# Patient Record
(demographics unavailable — no encounter records)

---

## 2024-11-09 NOTE — HISTORY OF PRESENT ILLNESS
[Patient reported PAP Smear was normal] : Patient reported PAP Smear was normal [No] : none [Y] : Patient uses contraception [Oral Contraceptive] : uses oral contraception pills [FreeTextEntry1] : GYN Annual [TextBox_4] : 38YO F patient presents for GYN annual exam. Hx of fibroids. Patient states mild pain with relations, denies bleeding. [PapSmeardate] : 10/31/23 [HPVDate] : 10/31/23 [TextBox_102] : irregular when off OCP due to PCOS [LMPDate] : 10/20/24 [de-identified] : On Junel

## 2024-11-09 NOTE — PHYSICAL EXAM
[Chaperone Present] : A chaperone was present in the examining room during all aspects of the physical examination [34445] : A chaperone was present during the pelvic exam. [Appropriately responsive] : appropriately responsive [Alert] : alert [No Acute Distress] : no acute distress [No Lymphadenopathy] : no lymphadenopathy [Oriented x3] : oriented x3 [Examination Of The Breasts] : a normal appearance [No Masses] : no breast masses were palpable [Labia Majora] : normal [Labia Minora] : normal [Normal] : normal [Soft] : soft [Non-tender] : non-tender [Non-distended] : non-distended [Breast Palpation Diffuse Fibrous Tissue Bilateral] : fibrocystic changes [Uterine Adnexae] : non-palpable [FreeTextEntry2] : YOLANDA Alcala [FreeTextEntry6] : No tenderness, No nipple discharge and no adenopathy. [FreeTextEntry8] : limited exam by body habitus

## 2024-11-09 NOTE — PLAN
[FreeTextEntry1] : Follow up STD's , FSH and TFT's states menses are light and regular on OCP  follow up MRI results  f/up in office 8-12 weeks

## 2024-11-09 NOTE — PHYSICAL EXAM
[Chaperone Present] : A chaperone was present in the examining room during all aspects of the physical examination [10709] : A chaperone was present during the pelvic exam. [Appropriately responsive] : appropriately responsive [Alert] : alert [No Acute Distress] : no acute distress [No Lymphadenopathy] : no lymphadenopathy [Oriented x3] : oriented x3 [Examination Of The Breasts] : a normal appearance [No Masses] : no breast masses were palpable [Labia Majora] : normal [Labia Minora] : normal [Normal] : normal [Soft] : soft [Non-tender] : non-tender [Non-distended] : non-distended [Breast Palpation Diffuse Fibrous Tissue Bilateral] : fibrocystic changes [Uterine Adnexae] : non-palpable [FreeTextEntry2] : YOLANDA Alcala [FreeTextEntry6] : No tenderness, No nipple discharge and no adenopathy. [FreeTextEntry8] : limited exam by body habitus

## 2024-11-09 NOTE — DISCUSSION/SUMMARY
[FreeTextEntry1] : GYN Annual evaluation today -pap smear and vaginal cultures sent -TVS done today for PCOS  we discussed,  Fibroid, PCOS and implications Patient verbally understood all explanations and her questions were all answered, and all concerns were addressed.  Patient was screened for depression - no signs of clinical depression. PHQ-2 on file Rx given for pelvis MRI r/o submucosal myomas RTO in 8-12 weeks for follow up   I, Aron mandujano acting as scribe for Dr. Christie. 11/04/2024   The documentation recorded by the scribe, in my presence, accurately reflects the service I personally performed, and the decisions made by me with my edits as appropriate on 11/09/2024  Mari Christie MD, FACOG

## 2024-11-09 NOTE — REVIEW OF SYSTEMS
[Patient Intake Form Reviewed] : Patient intake form was reviewed [Negative] : Heme/Lymph [de-identified] : No tenderness, No nipple discharge and no adenopathy.

## 2024-11-09 NOTE — HISTORY OF PRESENT ILLNESS
[Patient reported PAP Smear was normal] : Patient reported PAP Smear was normal [No] : none [Y] : Patient uses contraception [Oral Contraceptive] : uses oral contraception pills [FreeTextEntry1] : GYN Annual [TextBox_4] : 38YO F patient presents for GYN annual exam. Hx of fibroids. Patient states mild pain with relations, denies bleeding. [PapSmeardate] : 10/31/23 [HPVDate] : 10/31/23 [TextBox_102] : irregular when off OCP due to PCOS [LMPDate] : 10/20/24 [de-identified] : On Junel

## 2024-11-09 NOTE — REVIEW OF SYSTEMS
[Patient Intake Form Reviewed] : Patient intake form was reviewed [Negative] : Heme/Lymph [de-identified] : No tenderness, No nipple discharge and no adenopathy.

## 2024-12-30 NOTE — DISCUSSION/SUMMARY
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 Followup P sonogram showed  cyst resolved and myoma is smaller than on MRI - but we discussed should follow up with MRI size as a MRI is a more accurate size assessment studyAll laboratory tests for thyroid function and STDs were negative.  - Plan : Continue monitoring for any changes in symptoms or size and to bring to our attention - Schedule follow-up appointment in 6 months Patient verbalized understanding of all explanations, and her questions were answered, and all concerns were addressed.  RTO in 6months to follow up myoma and ovarian cysts PCOS    I, Aron mandujano acting as scribe for Dr. Christie. 12/30/2024     The documentation recorded by the scribe, in my presence, accurately reflects the service I personally performed, and the decisions made by me with my edits as appropriate on 12/30/2024  Mari Christie MD, FACOG

## 2024-12-30 NOTE — HISTORY OF PRESENT ILLNESS
[Patient reported PAP Smear was normal] : Patient reported PAP Smear was normal [N] : Patient reports normal menses [Y] : Patient uses contraception [Oral Contraceptive] : uses oral contraception pills [No] : Patient does not have concerns regarding sex [PapSmeardate] : 11/4/24 [LMPDate] : 12/9/24 [FreeTextEntry1] : 12/9/24

## 2024-12-30 NOTE — PHYSICAL EXAM
[Chaperone Present] : A chaperone was present in the examining room during all aspects of the physical examination [Appropriately responsive] : appropriately responsive [Alert] : alert [No Acute Distress] : no acute distress [Oriented x3] : oriented x3 [Soft] : soft [Non-tender] : non-tender [Non-distended] : non-distended [FreeTextEntry2] : YOLANDA Alcala

## 2025-03-11 NOTE — PHYSICAL EXAM
[Obese] : obese [Normal] : affect appropriate [de-identified] : Current BMI 30.  Limited due to telehealth visit.

## 2025-03-11 NOTE — ASSESSMENT
[FreeTextEntry1] : In summary, patient is status post laparoscopic sleeve gastrectomy and also started on Wegovy for weight loss stall/plateau.  Is currently on 1 mg dose with no reported adverse side effects currently however states she went to the urgent care about 2 weeks ago due to abdominal discomfort and right sided posterior flank pain.  Reports that she does not believe she drinks enough water.  Stressed importance of hydration to avoid kidney stones.  Will send for complete blood work and contact patient once resulted with any abnormalities.  Currently at today's visit she feels well and is in no apparent distress.  I will continue her on 1 mg dose without increasing to any higher doses.  Patient agrees with plan.  All questions and concerns answered at today's visit.

## 2025-03-11 NOTE — HISTORY OF PRESENT ILLNESS
[Home] : at home, [unfilled] , at the time of the visit. [Medical Office: ([facility])___] : at the medical office located in  [Telehealth (audio & video)] : This visit was provided via telehealth using real-time 2-way audio visual technology. [Verbal consent obtained from patient] : the patient, [unfilled] [de-identified] : KIMBERLY is a 39 year old female here for a follow up visit s/p laparoscopic sleeve gastrectomy on 07/20/22. Patient has done well postoperatively however has slight weight regain since last being seen. Patient has gained 8 pounds since February of this year. We discussed increasing physical activity. In the past, patient had seen myself for phentermine which was proven ineffective, and patient had some side effects. We tried to initiate Zepbound prescription however it was denied by her insurance due to the fact that patient needs to try Qsymia or Wegovy first. Was placed on wegovy and presents for f/u.     [de-identified] : Patient currently on Wegovy 1 g dose.  She has lost 6 pounds since last being seen by myself August 2024.  Overall doing well on 1 mg dose and would like to continue on this dose.  Of note, patient states she went to urgent care last week for right posterior flank pain.  She also reported some stomach "gurgling sensation".  She has been on 1 mg dose for several months with no reported side effects.  An ultrasound was performed and patient awaiting results.  I will send patient for complete blood work to assess liver kidney pancreatic function as well as glucose, hemoglobin A1c and vitamin levels.  Once resulted I will contact patient with any new abnormalities.  Lab orders placed today.  Will see patient in June/July for follow-up.

## 2025-03-11 NOTE — HISTORY OF PRESENT ILLNESS
[Home] : at home, [unfilled] , at the time of the visit. [Medical Office: ([facility])___] : at the medical office located in  [Telehealth (audio & video)] : This visit was provided via telehealth using real-time 2-way audio visual technology. [Verbal consent obtained from patient] : the patient, [unfilled] [de-identified] : KIMBERLY is a 39 year old female here for a follow up visit s/p laparoscopic sleeve gastrectomy on 07/20/22. Patient has done well postoperatively however has slight weight regain since last being seen. Patient has gained 8 pounds since February of this year. We discussed increasing physical activity. In the past, patient had seen myself for phentermine which was proven ineffective, and patient had some side effects. We tried to initiate Zepbound prescription however it was denied by her insurance due to the fact that patient needs to try Qsymia or Wegovy first. Was placed on wegovy and presents for f/u.     [de-identified] : Patient currently on Wegovy 1 g dose.  She has lost 6 pounds since last being seen by myself August 2024.  Overall doing well on 1 mg dose and would like to continue on this dose.  Of note, patient states she went to urgent care last week for right posterior flank pain.  She also reported some stomach "gurgling sensation".  She has been on 1 mg dose for several months with no reported side effects.  An ultrasound was performed and patient awaiting results.  I will send patient for complete blood work to assess liver kidney pancreatic function as well as glucose, hemoglobin A1c and vitamin levels.  Once resulted I will contact patient with any new abnormalities.  Lab orders placed today.  Will see patient in June/July for follow-up.

## 2025-03-11 NOTE — PHYSICAL EXAM
[Obese] : obese [Normal] : affect appropriate [de-identified] : Current BMI 30.  Limited due to telehealth visit.

## 2025-03-14 NOTE — REASON FOR VISIT
[Home] : at home, [unfilled] , at the time of the visit. [Medical Office: ([facility])___] : at the medical office located in  [Verbal consent obtained from patient] : the patient, [unfilled] [Follow-Up: _____] : a [unfilled] follow-up visit [Telehealth (audio & video)] : This visit was provided via telehealth using real-time 2-way audio visual technology.

## 2025-03-14 NOTE — REASON FOR VISIT
[Home] : at home, [unfilled] , at the time of the visit. [Medical Office: ([facility])___] : at the medical office located in  [Verbal consent obtained from patient] : the patient, [unfilled] [Follow-Up: _____] : a [unfilled] follow-up visit [Telehealth (audio & video)] : This visit was provided via telehealth using real-time 2-way audio visual technology.

## 2025-03-14 NOTE — HISTORY OF PRESENT ILLNESS
[de-identified] : KIMBERLY is a 39 year old female here to discuss US results 03/11/2025. She is s/p sleeve gastrectomy in 2022. Is on wegovy 1mg for ~1 month.  Previously was on lower dose since September. Says she is had right upper quadrant pain and discomfort for the last 4 days.  She went to an urgent care, where ultrasound on 3/11/2025 demonstrated cholelithiasis without evidence of cholecystitis. She feels better this morning, reporting no pain.  She is able to eat and drink normally.  She has had normal bowel function.  Reports no fevers.

## 2025-03-14 NOTE — HISTORY OF PRESENT ILLNESS
[de-identified] : KIMBERLY is a 39 year old female here to discuss US results 03/11/2025. She is s/p sleeve gastrectomy in 2022. Is on wegovy 1mg for ~1 month.  Previously was on lower dose since September. Says she is had right upper quadrant pain and discomfort for the last 4 days.  She went to an urgent care, where ultrasound on 3/11/2025 demonstrated cholelithiasis without evidence of cholecystitis. She feels better this morning, reporting no pain.  She is able to eat and drink normally.  She has had normal bowel function.  Reports no fevers.

## 2025-03-14 NOTE — ASSESSMENT
[FreeTextEntry1] : 39-year-old female with symptomatic cholelithiasis. I have recommended laparoscopic cholecystectomy. Risks, benefits, alternatives discussed at length with patient. This included discussion of laparoscopic approach, risk of bleeding, small risk of injury to biliary ductal system, and possibility of conversion to open procedure. The patient expressed understanding.

## 2025-03-14 NOTE — PLAN
[FreeTextEntry1] : [] Advised to hold Wegovy for the time being, as this may exacerbate symptoms and would have to be held for surgery anyway [] Patient to schedule cholecystectomy at her convenience [] Advised to call me or seek medical care if pain worsens or she develops fevers or nausea.

## 2025-04-03 NOTE — PLAN
[FreeTextEntry1] : Patient was reassured of normal wound appearance.  I advised her to keep the area clean and dry.  She should apply gauze dressing with paper tape at least twice per day.  She should monitor the wound, and if she notices any increased drainage or erythema, she will call me right away. Continue low-fat diet Avoid heavy lifting or abdominal exercises. Patient to follow-up with me in 1 to 2 weeks

## 2025-04-03 NOTE — HISTORY OF PRESENT ILLNESS
[de-identified] : Barbara is a 39-year-old female who presents for postop status post laparoscopic cholecystectomy performed on April 1, 2025.  [de-identified] : She comes in today because she has noticed drainage from her umbilical incision site.  She is currently postop day 2 from her surgery. Yesterday, she noted bloody drainage from the wound.  I did speak to her on the phone and instructed her to remove the Tegaderm and the underlying dressing, and she replaced with dry gauze. She has some incisional discomfort at that site, painful when engaging her core muscles, but comfortable at rest.  She is taking Tylenol for pain (does not want to take opioids, and would like to minimize NSAIDs as she has a history of sleeve gastrectomy).  She otherwise is doing well.  Has a good appetite and is tolerating a low-fat diet.  She is ambulating well and voiding without issue.  She reports no nausea, fevers, or malaise.

## 2025-04-03 NOTE — ASSESSMENT
[FreeTextEntry1] : 39-year-old female status post laparoscopic cholecystectomy on 4/1/2025 for symptomatic cholelithiasis. She had some postoperative bruising and discomfort at her umbilical incision site, which looks to be healing well and without any evidence of infection.

## 2025-04-03 NOTE — PHYSICAL EXAM
[No Rash or Lesion] : No rash or lesion [Alert] : alert [Oriented to Person] : oriented to person [Oriented to Place] : oriented to place [Oriented to Time] : oriented to time [de-identified] : Well-appearing, no acute distress [de-identified] : Sclerae anicteric [de-identified] : Soft, nondistended, appropriately tender to palpation around umbilical incision site.  Umbilical incision is healing well, with minimal serous drainage.  There is no surrounding erythema, no purulence, and no induration.  The other subcostal incision sites are well-healed without erythema or drainage.

## 2025-04-11 NOTE — ASSESSMENT
[FreeTextEntry1] : 39-year-old female status post laparoscopic cholecystectomy on 4/1/2025 for symptomatic cholelithiasis. Healing well with minimal clear drainage from healing umbilical incision site.

## 2025-04-11 NOTE — HISTORY OF PRESENT ILLNESS
[de-identified] : KIMBERLY is a 39 year old female, s/p Laparoscopic cholecystectomy on 04/01/2025 and s/p laparoscopic sleeve gastrectomy on 07/20/22. She returns today for wound check.  She has been doing much better, with marked improvement in incisional discomfort.  She is tolerating a low-fat diet.  Reports no fevers or chills.  The umbilical incision feels much better, she has been covering it with gauze, and has noted a small amount of clear drainage when she changes the dressing.  She reports no bleeding.

## 2025-04-11 NOTE — HISTORY OF PRESENT ILLNESS
[de-identified] : KIMBERLY is a 39 year old female, s/p Laparoscopic cholecystectomy on 04/01/2025 and s/p laparoscopic sleeve gastrectomy on 07/20/22. She returns today for wound check.  She has been doing much better, with marked improvement in incisional discomfort.  She is tolerating a low-fat diet.  Reports no fevers or chills.  The umbilical incision feels much better, she has been covering it with gauze, and has noted a small amount of clear drainage when she changes the dressing.  She reports no bleeding.

## 2025-04-11 NOTE — PHYSICAL EXAM
[No Rash or Lesion] : No rash or lesion [Alert] : alert [Oriented to Person] : oriented to person [Oriented to Place] : oriented to place [Oriented to Time] : oriented to time [de-identified] : Well-appearing, no acute distress [de-identified] : Sclerae anicteric [de-identified] : Soft, nondistended, nontender.  Umbilical incision is healing well, with minimal serous drainage.  There is no surrounding erythema, no purulence, and no induration.  The other subcostal incision sites are well-healed without erythema or drainage.

## 2025-04-11 NOTE — PHYSICAL EXAM
[No Rash or Lesion] : No rash or lesion [Alert] : alert [Oriented to Person] : oriented to person [Oriented to Place] : oriented to place [Oriented to Time] : oriented to time [de-identified] : Well-appearing, no acute distress [de-identified] : Sclerae anicteric [de-identified] : Soft, nondistended, nontender.  Umbilical incision is healing well, with minimal serous drainage.  There is no surrounding erythema, no purulence, and no induration.  The other subcostal incision sites are well-healed without erythema or drainage.

## 2025-04-11 NOTE — PLAN
[FreeTextEntry1] : Pathology results benign Advised to continue twice daily dressing changes with dry gauze to the umbilicus; keep the area clean and dry Follow-up 1 month for wound check

## 2025-05-12 NOTE — PHYSICAL EXAM
[No Rash or Lesion] : No rash or lesion [Alert] : alert [Oriented to Person] : oriented to person [Oriented to Place] : oriented to place [Oriented to Time] : oriented to time [de-identified] : Well-appearing, no acute distress [de-identified] : Sclerae anicteric [de-identified] : Soft, nondistended, nontender.  Incisions are well-healed without erythema or drainage.

## 2025-05-12 NOTE — HISTORY OF PRESENT ILLNESS
[de-identified] : KIMBERLY is a 39 year old female s/p Laparoscopic cholecystectomy on 04/01/2025 returning for second follow-up visit and wound check. She says she is doing much better.  Reports no more drainage from her umbilical wound.  Minimal right sided incisional discomfort is improving.  She is back to work.  She is tolerating a low-fat diet and reports normal bowel function.

## 2025-05-12 NOTE — PHYSICAL EXAM
[No Rash or Lesion] : No rash or lesion [Alert] : alert [Oriented to Person] : oriented to person [Oriented to Place] : oriented to place [Oriented to Time] : oriented to time [de-identified] : Well-appearing, no acute distress [de-identified] : Sclerae anicteric [de-identified] : Soft, nondistended, nontender.  Incisions are well-healed without erythema or drainage.

## 2025-05-12 NOTE — ASSESSMENT
[FreeTextEntry1] : 39-year-old female status post laparoscopic cholecystectomy on 4/1/2025 for symptomatic cholelithiasis. Incisions are well healed and she is recovering as expected. Pathology benign.

## 2025-05-12 NOTE — HISTORY OF PRESENT ILLNESS
[de-identified] : KIMBERLY is a 39 year old female s/p Laparoscopic cholecystectomy on 04/01/2025 returning for second follow-up visit and wound check. She says she is doing much better.  Reports no more drainage from her umbilical wound.  Minimal right sided incisional discomfort is improving.  She is back to work.  She is tolerating a low-fat diet and reports normal bowel function.